# Patient Record
Sex: FEMALE | Race: BLACK OR AFRICAN AMERICAN | NOT HISPANIC OR LATINO | Employment: FULL TIME | URBAN - METROPOLITAN AREA
[De-identification: names, ages, dates, MRNs, and addresses within clinical notes are randomized per-mention and may not be internally consistent; named-entity substitution may affect disease eponyms.]

---

## 2023-04-27 SDOH — ECONOMIC STABILITY: FOOD INSECURITY: WITHIN THE PAST 12 MONTHS, YOU WORRIED THAT YOUR FOOD WOULD RUN OUT BEFORE YOU GOT MONEY TO BUY MORE.: NEVER TRUE

## 2023-04-27 SDOH — HEALTH STABILITY: PHYSICAL HEALTH: ON AVERAGE, HOW MANY DAYS PER WEEK DO YOU ENGAGE IN MODERATE TO STRENUOUS EXERCISE (LIKE A BRISK WALK)?: 3 DAYS

## 2023-04-27 SDOH — ECONOMIC STABILITY: INCOME INSECURITY: IN THE LAST 12 MONTHS, WAS THERE A TIME WHEN YOU WERE NOT ABLE TO PAY THE MORTGAGE OR RENT ON TIME?: NO

## 2023-04-27 SDOH — ECONOMIC STABILITY: FOOD INSECURITY: WITHIN THE PAST 12 MONTHS, THE FOOD YOU BOUGHT JUST DIDN'T LAST AND YOU DIDN'T HAVE MONEY TO GET MORE.: NEVER TRUE

## 2023-04-27 SDOH — ECONOMIC STABILITY: INCOME INSECURITY: HOW HARD IS IT FOR YOU TO PAY FOR THE VERY BASICS LIKE FOOD, HOUSING, MEDICAL CARE, AND HEATING?: NOT VERY HARD

## 2023-04-27 SDOH — HEALTH STABILITY: PHYSICAL HEALTH: ON AVERAGE, HOW MANY MINUTES DO YOU ENGAGE IN EXERCISE AT THIS LEVEL?: 40 MIN

## 2023-04-27 SDOH — ECONOMIC STABILITY: TRANSPORTATION INSECURITY
IN THE PAST 12 MONTHS, HAS LACK OF TRANSPORTATION KEPT YOU FROM MEETINGS, WORK, OR FROM GETTING THINGS NEEDED FOR DAILY LIVING?: NO

## 2023-04-27 SDOH — ECONOMIC STABILITY: TRANSPORTATION INSECURITY
IN THE PAST 12 MONTHS, HAS THE LACK OF TRANSPORTATION KEPT YOU FROM MEDICAL APPOINTMENTS OR FROM GETTING MEDICATIONS?: NO

## 2023-04-27 ASSESSMENT — ENCOUNTER SYMPTOMS
CONSTIPATION: 0
DIARRHEA: 0
COUGH: 0
ABDOMINAL PAIN: 0
DIZZINESS: 0
WEAKNESS: 0
FREQUENCY: 0
HEADACHES: 0
CHILLS: 0
MYALGIAS: 0
SHORTNESS OF BREATH: 0
HEMATURIA: 0
ARTHRALGIAS: 0
DYSURIA: 0
PARESTHESIAS: 0
NAUSEA: 0
NERVOUS/ANXIOUS: 1
PALPITATIONS: 0
SORE THROAT: 0
FEVER: 0
BREAST MASS: 0
EYE PAIN: 0
HEMATOCHEZIA: 0
JOINT SWELLING: 0
HEARTBURN: 0

## 2023-04-27 ASSESSMENT — SOCIAL DETERMINANTS OF HEALTH (SDOH)
HOW OFTEN DO YOU GET TOGETHER WITH FRIENDS OR RELATIVES?: ONCE A WEEK
IN A TYPICAL WEEK, HOW MANY TIMES DO YOU TALK ON THE PHONE WITH FAMILY, FRIENDS, OR NEIGHBORS?: MORE THAN THREE TIMES A WEEK
HOW OFTEN DO YOU ATTEND CHURCH OR RELIGIOUS SERVICES?: 1 TO 4 TIMES PER YEAR
DO YOU BELONG TO ANY CLUBS OR ORGANIZATIONS SUCH AS CHURCH GROUPS UNIONS, FRATERNAL OR ATHLETIC GROUPS, OR SCHOOL GROUPS?: NO

## 2023-04-27 ASSESSMENT — LIFESTYLE VARIABLES
SKIP TO QUESTIONS 9-10: 0
AUDIT-C TOTAL SCORE: 2
HOW MANY STANDARD DRINKS CONTAINING ALCOHOL DO YOU HAVE ON A TYPICAL DAY: 1 OR 2
HOW OFTEN DO YOU HAVE SIX OR MORE DRINKS ON ONE OCCASION: LESS THAN MONTHLY
HOW OFTEN DO YOU HAVE A DRINK CONTAINING ALCOHOL: MONTHLY OR LESS

## 2023-05-01 ENCOUNTER — OFFICE VISIT (OUTPATIENT)
Dept: FAMILY MEDICINE | Facility: CLINIC | Age: 28
End: 2023-05-01
Payer: COMMERCIAL

## 2023-05-01 VITALS
HEIGHT: 64 IN | TEMPERATURE: 97.9 F | DIASTOLIC BLOOD PRESSURE: 80 MMHG | SYSTOLIC BLOOD PRESSURE: 101 MMHG | WEIGHT: 188.9 LBS | BODY MASS INDEX: 32.25 KG/M2 | OXYGEN SATURATION: 99 % | HEART RATE: 66 BPM | RESPIRATION RATE: 16 BRPM

## 2023-05-01 DIAGNOSIS — Z31.69 INFERTILITY COUNSELING: ICD-10-CM

## 2023-05-01 DIAGNOSIS — Z00.00 ROUTINE GENERAL MEDICAL EXAMINATION AT A HEALTH CARE FACILITY: Primary | ICD-10-CM

## 2023-05-01 DIAGNOSIS — Z30.09 FAMILY PLANNING ADVICE: ICD-10-CM

## 2023-05-01 DIAGNOSIS — Z12.4 CERVICAL CANCER SCREENING: ICD-10-CM

## 2023-05-01 PROCEDURE — 99385 PREV VISIT NEW AGE 18-39: CPT | Performed by: NURSE PRACTITIONER

## 2023-05-01 PROCEDURE — G0145 SCR C/V CYTO,THINLAYER,RESCR: HCPCS | Performed by: NURSE PRACTITIONER

## 2023-05-01 RX ORDER — PRENATAL VIT/IRON FUM/FOLIC AC 27MG-0.8MG
1 TABLET ORAL DAILY
Qty: 90 TABLET | Refills: 3
Start: 2023-05-01 | End: 2024-07-30

## 2023-05-01 ASSESSMENT — ENCOUNTER SYMPTOMS
COUGH: 0
MYALGIAS: 0
SORE THROAT: 0
DIZZINESS: 0
ABDOMINAL PAIN: 0
FREQUENCY: 0
ARTHRALGIAS: 0
JOINT SWELLING: 0
PALPITATIONS: 0
CONSTIPATION: 0
CHILLS: 0
WEAKNESS: 0
PARESTHESIAS: 0
EYE PAIN: 0
HEMATOCHEZIA: 0
HEMATURIA: 0
SHORTNESS OF BREATH: 0
DYSURIA: 0
HEARTBURN: 0
HEADACHES: 0
BREAST MASS: 0
DIARRHEA: 0
FEVER: 0
NAUSEA: 0
NERVOUS/ANXIOUS: 1

## 2023-05-01 ASSESSMENT — PAIN SCALES - GENERAL: PAINLEVEL: NO PAIN (0)

## 2023-05-01 NOTE — PROGRESS NOTES
SUBJECTIVE:   CC: Aure is an 28 year old who presents for preventive health visit.       5/1/2023     8:56 AM   Additional Questions   Roomed by Pretty PRIDE   Patient has been advised of split billing requirements and indicates understanding: Yes  Healthy Habits:     Getting at least 3 servings of Calcium per day:  Yes    Bi-annual eye exam:  Yes    Dental care twice a year:  NO    Sleep apnea or symptoms of sleep apnea:  None    Diet:  Regular (no restrictions)    Frequency of exercise:  2-3 days/week    Duration of exercise:  30-45 minutes    Taking medications regularly:  Yes    Medication side effects:  Not applicable    PHQ-2 Total Score: 2    Additional concerns today:  No      Today's PHQ-2 Score:       4/27/2023     7:00 PM   PHQ-2 ( 1999 [employer])   Q1: Little interest or pleasure in doing things 1   Q2: Feeling down, depressed or hopeless 1   PHQ-2 Score 2   Q1: Little interest or pleasure in doing things Not at all    Several days   Q2: Feeling down, depressed or hopeless Several days    Several days   PHQ-2 Score 1    2       Have you ever done Advance Care Planning? (For example, a Health Directive, POLST, or a discussion with a medical provider or your loved ones about your wishes): No, advance care planning information given to patient to review.  Patient plans to discuss their wishes with loved ones or provider.      Social History     Tobacco Use     Smoking status: Never     Smokeless tobacco: Never   Vaping Use     Vaping status: Never Used     Passive vaping exposure: Yes   Substance Use Topics     Alcohol use: Not Currently           4/27/2023     7:00 PM   Alcohol Use   Prescreen: >3 drinks/day or >7 drinks/week? No     Reviewed orders with patient.  Reviewed health maintenance and updated orders accordingly - Yes  No labs needed    Breast Cancer Screening:  n/a      4/27/2023     7:08 PM   Breast CA Risk Assessment (FHS-7)   Do you have a family history of breast, colon, or ovarian cancer? No  / Unknown       History of abnormal Pap smear: NO - age 21-29 PAP every 3 years recommended     Reviewed and updated as needed this visit by clinical staff   Tobacco              Non smoking    Reviewed and updated as needed this visit by Provider                Review of Systems   Constitutional: Negative for chills and fever.   HENT: Negative for congestion, ear pain, hearing loss and sore throat.    Eyes: Negative for pain and visual disturbance.   Respiratory: Negative for cough and shortness of breath.    Cardiovascular: Negative for chest pain, palpitations and peripheral edema.   Gastrointestinal: Negative for abdominal pain, constipation, diarrhea, heartburn, hematochezia and nausea.   Breasts:  Negative for tenderness, breast mass and discharge.   Genitourinary: Negative for dysuria, frequency, genital sores, hematuria, pelvic pain, urgency, vaginal bleeding and vaginal discharge.   Musculoskeletal: Negative for arthralgias, joint swelling and myalgias.   Skin: Negative for rash.   Neurological: Negative for dizziness, weakness, headaches and paresthesias.   Psychiatric/Behavioral: Negative for mood changes. The patient is nervous/anxious.      CONSTITUTIONAL: NEGATIVE for fever, chills, change in weight  INTEGUMENTARU/SKIN: NEGATIVE for worrisome rashes, moles or lesions  EYES: NEGATIVE for vision changes or irritation  ENT: NEGATIVE for ear, mouth and throat problems  RESP: NEGATIVE for significant cough or SOB  BREAST: NEGATIVE for masses, tenderness or discharge  CV: NEGATIVE for chest pain, palpitations or peripheral edema  GI: NEGATIVE for nausea, abdominal pain, heartburn, or change in bowel habits  : NEGATIVE for unusual urinary or vaginal symptoms. Periods are regular.  MUSCULOSKELETAL: NEGATIVE for significant arthralgias or myalgia  NEURO: NEGATIVE for weakness, dizziness or paresthesias  PSYCHIATRIC: NEGATIVE for changes in mood or affect     OBJECTIVE:   /80 (BP Location: Right arm,  "Patient Position: Sitting, Cuff Size: Adult Large)   Pulse 66   Temp 97.9  F (36.6  C) (Oral)   Resp 16   Ht 1.613 m (5' 3.5\")   Wt 85.7 kg (188 lb 14.4 oz)   LMP 04/06/2023 (Exact Date)   SpO2 99%   BMI 32.94 kg/m    Physical Exam  GENERAL: healthy, alert and no distress  EYES: Eyes grossly normal to inspection, PERRL and conjunctivae and sclerae normal  HENT: ear canals and TM's normal, nose and mouth without ulcers or lesions  NECK: no adenopathy, no asymmetry, masses, or scars and thyroid normal to palpation  RESP: lungs clear to auscultation - no rales, rhonchi or wheezes  BREAST: deferred  CV: regular rate and rhythm, normal S1 S2, no S3 or S4, no murmur, click or rub, no peripheral edema and peripheral pulses strong  ABDOMEN: soft, nontender, no hepatosplenomegaly, no masses and bowel sounds normal  : vagina normal, cervix normal, no lesions drainage or abnormal odor noted, pap smear completed  MS: no gross musculoskeletal defects noted, no edema  SKIN: no suspicious lesions or rashes  NEURO: Normal strength and tone, mentation intact and speech normal  PSYCH: mentation appears normal, affect normal/bright        ASSESSMENT/PLAN:   Aure was seen today for physical.    Diagnoses and all orders for this visit:    Routine general medical examination at a health care facility  - up to date  Cervical cancer screening  -     Pap Screen reflex to HPV if ASCUS - recommend age 25 - 29        -  Pap done  Infertility counseling  -     Infertility/AI Referral; Future        -     Patient and  been trying for one year without success, will refer to RE  Family planning advice  -     Prenatal Vit-Fe Fumarate-FA (PRENATAL MULTIVITAMIN W/IRON) 27-0.8 MG tablet; Take 1 tablet by mouth daily    Other orders  -     REVIEW OF HEALTH MAINTENANCE PROTOCOL ORDERS  -     PRIMARY CARE FOLLOW-UP SCHEDULING; Future        Patient has been advised of split billing requirements and indicates understanding: " "Yes      COUNSELING:  Reviewed preventive health counseling, as reflected in patient instructions       Regular exercise       Healthy diet/nutrition       Family planning       Folic Acid      BMI:   Estimated body mass index is 32.94 kg/m  as calculated from the following:    Height as of this encounter: 1.613 m (5' 3.5\").    Weight as of this encounter: 85.7 kg (188 lb 14.4 oz).   Weight management plan: Discussed healthy diet and exercise guidelines      She reports that she has never smoked. She has never used smokeless tobacco.      Carina Cuello NP  [facility]  "

## 2023-05-01 NOTE — PATIENT INSTRUCTIONS
Check out CCR for infertility and make an appointment.     [email or web address]    Preventive Health Recommendations  Female Ages 26 - 39  Yearly exam:   See your health care provider every year in order to  Review health changes.   Discuss preventive care.    Review your medicines if you your doctor has prescribed any.    Until age 30: Get a Pap test every three years (more often if you have had an abnormal result).    After age 30: Talk to your doctor about whether you should have a Pap test every 3 years or have a Pap test with HPV screening every 5 years.   You do not need a Pap test if your uterus was removed (hysterectomy) and you have not had cancer.  You should be tested each year for STDs (sexually transmitted diseases), if you're at risk.   Talk to your provider about how often to have your cholesterol checked.  If you are at risk for diabetes, you should have a diabetes test (fasting glucose).  Shots: Get a flu shot each year. Get a tetanus shot every 10 years.   Nutrition:   Eat at least 5 servings of fruits and vegetables each day.  Eat whole-grain bread, whole-wheat pasta and brown rice instead of white grains and rice.  Get adequate Calcium and Vitamin D.     Lifestyle  Exercise at least 150 minutes a week (30 minutes a day, 5 days of the week). This will help you control your weight and prevent disease.  Limit alcohol to one drink per day.  No smoking.   Wear sunscreen to prevent skin cancer.  See your dentist every six months for an exam and cleaning.

## 2023-05-03 LAB
BKR LAB AP GYN ADEQUACY: NORMAL
BKR LAB AP GYN INTERPRETATION: NORMAL
BKR LAB AP HPV REFLEX: NORMAL
BKR LAB AP LMP: NORMAL
BKR LAB AP PREVIOUS ABNORMAL: NORMAL
PATH REPORT.COMMENTS IMP SPEC: NORMAL
PATH REPORT.COMMENTS IMP SPEC: NORMAL
PATH REPORT.RELEVANT HX SPEC: NORMAL

## 2023-05-22 ENCOUNTER — OFFICE VISIT (OUTPATIENT)
Dept: MIDWIFE SERVICES | Facility: CLINIC | Age: 28
End: 2023-05-22
Payer: COMMERCIAL

## 2023-05-22 VITALS
WEIGHT: 190 LBS | HEART RATE: 75 BPM | SYSTOLIC BLOOD PRESSURE: 136 MMHG | BODY MASS INDEX: 33.13 KG/M2 | TEMPERATURE: 97 F | DIASTOLIC BLOOD PRESSURE: 82 MMHG

## 2023-05-22 DIAGNOSIS — N91.2 AMENORRHEA: Primary | ICD-10-CM

## 2023-05-22 DIAGNOSIS — Z31.69 ENCOUNTER FOR PRECONCEPTION CONSULTATION: ICD-10-CM

## 2023-05-22 LAB
HCG INTACT+B SERPL-ACNC: <1 MIU/ML
TSH SERPL DL<=0.005 MIU/L-ACNC: 1.36 UIU/ML (ref 0.3–4.2)

## 2023-05-22 PROCEDURE — 99203 OFFICE O/P NEW LOW 30 MIN: CPT | Performed by: ADVANCED PRACTICE MIDWIFE

## 2023-05-22 PROCEDURE — 84702 CHORIONIC GONADOTROPIN TEST: CPT | Performed by: ADVANCED PRACTICE MIDWIFE

## 2023-05-22 PROCEDURE — 84443 ASSAY THYROID STIM HORMONE: CPT | Performed by: ADVANCED PRACTICE MIDWIFE

## 2023-05-22 PROCEDURE — 36415 COLL VENOUS BLD VENIPUNCTURE: CPT | Performed by: ADVANCED PRACTICE MIDWIFE

## 2023-05-22 NOTE — PROGRESS NOTES
Aure Puckett 28 year old Patient's last menstrual period was 04/06/2023 (exact date). Nullip.    CC: missed menses    HPI:  Pt has been trying to get pregnant for over a year and has not been able to.  Right now she is 10 days late on her menses with negative home pregnancy tests.   Emotional in talking about trying to conceive.  Reports regular unprotected IC over the past year   Taking PNV    Noemí removed a little over a year ago   Menses q 33-37 days since then   Never smoker   Denies STI hx   Body mass index is 33.13 kg/m .     A:  Healthy 28 yr/old   Desires conception    P:  (N91.2) Amenorrhea  (primary encounter diagnosis)  Comment:   Plan: hCG Quantitative Pregnancy, TSH with free T4         reflex            (Z31.69) Encounter for preconception consultation  Comment:   Plan:          If beta hcg is negative will plan a progesterone challenge to trigger menses  Message sent to schedulers to help her make appt with OB/Gyn for infertility work up   Message sent to Saray Boudreaux for emotional support   Pt could also consider mind body work, yoga, meditation, acupuncture     MURIEL Lopez CNM

## 2023-05-22 NOTE — PATIENT INSTRUCTIONS
Dear Aure     It was nice to meet with you today.  If your blood level of pregnancy hormone level is negative then I will send a short course of progesterone to your pharmacy to take for 5 days and then stop which should trigger a period.    I would also like you to make an appointment with one of our OB/gyn physicians     Dr Ludivina Edouard, Dr Diana Ward and Dr Lydia Stahl come here to [place].    Dr Sophia Escobar, and Dr Georgia Espino go to [place].    Dr Susan Delacruz, Ludivina Joseph and Dr Ifeoma Mahajan only see people at [facility].  The above mentioned physicians also see patients at [facility].    If you have any questions please let me know.    MURIEL Lopez CNM

## 2023-05-23 ENCOUNTER — TELEPHONE (OUTPATIENT)
Dept: MIDWIFE SERVICES | Facility: CLINIC | Age: 28
End: 2023-05-23
Payer: COMMERCIAL

## 2023-05-23 ENCOUNTER — MYC MEDICAL ADVICE (OUTPATIENT)
Dept: MIDWIFE SERVICES | Facility: CLINIC | Age: 28
End: 2023-05-23
Payer: COMMERCIAL

## 2023-05-23 DIAGNOSIS — N91.1 SECONDARY AMENORRHEA: Primary | ICD-10-CM

## 2023-05-23 RX ORDER — PROGESTERONE 200 MG/1
200 CAPSULE ORAL DAILY
Qty: 5 CAPSULE | Refills: 0 | Status: SHIPPED | OUTPATIENT
Start: 2023-05-23 | End: 2023-11-08

## 2023-05-25 ENCOUNTER — TELEPHONE (OUTPATIENT)
Dept: BEHAVIORAL HEALTH | Facility: CLINIC | Age: 28
End: 2023-05-25
Payer: COMMERCIAL

## 2023-05-25 NOTE — TELEPHONE ENCOUNTER
Patient outreach per CNM referral. No answer - left VM and sent MyChart message with [facility] information.

## 2023-05-26 ENCOUNTER — OFFICE VISIT (OUTPATIENT)
Dept: OBGYN | Facility: CLINIC | Age: 28
End: 2023-05-26
Payer: COMMERCIAL

## 2023-05-26 VITALS
SYSTOLIC BLOOD PRESSURE: 100 MMHG | BODY MASS INDEX: 33.44 KG/M2 | HEART RATE: 67 BPM | WEIGHT: 191.8 LBS | DIASTOLIC BLOOD PRESSURE: 70 MMHG

## 2023-05-26 DIAGNOSIS — N92.6 IRREGULAR MENSTRUAL CYCLE: Primary | ICD-10-CM

## 2023-05-26 LAB — MIS SERPL-MCNC: 7.25 NG/ML (ref 0.89–9.9)

## 2023-05-26 PROCEDURE — 99215 OFFICE O/P EST HI 40 MIN: CPT | Performed by: OBSTETRICS & GYNECOLOGY

## 2023-05-26 PROCEDURE — 83520 IMMUNOASSAY QUANT NOS NONAB: CPT | Performed by: OBSTETRICS & GYNECOLOGY

## 2023-05-26 PROCEDURE — 36415 COLL VENOUS BLD VENIPUNCTURE: CPT | Performed by: OBSTETRICS & GYNECOLOGY

## 2023-05-26 NOTE — PROGRESS NOTES
CC: getting pregnant  HPI: Aure Puckett is a 28 year old female Patient's last menstrual period was 05/24/2023 (exact date). Menses are regular q 32-34 days, lasting 5 days. Dysmenorrhea mild, occurring first 1-2 days of flow.   Cyclic symptoms include  breast tenderness and bloating.   Additional symptoms include none    The patient has been trying to conceive for 1 year since IUD was removed.  They are not timing intercourse or planning, just not contracepting in the hopes they get pregnant.  Her periods have been regular until last month when she was 18 days late, not pregnant, and she began to worry that maybe something is wrong     Prior pregnancy history is as follows: none    Allergies: Patient has no known allergies.                    No past medical history on file.    No past surgical history on file.        Social History     Tobacco Use     Smoking status: Never     Smokeless tobacco: Never   Vaping Use     Vaping status: Never Used     Passive vaping exposure: Yes   Substance Use Topics     Alcohol use: Not Currently              Family History   Problem Relation Age of Onset     Hypertension Mother      Depression Mother      Anxiety Disorder Mother      Diabetes Father      Hypertension Father        Current Outpatient Medications   Medication Sig Dispense Refill     Prenatal Vit-Fe Fumarate-FA (PRENATAL MULTIVITAMIN W/IRON) 27-0.8 MG tablet Take 1 tablet by mouth daily 90 tablet 3     progesterone (PROMETRIUM) 200 MG capsule Take 1 capsule (200 mg) by mouth daily 5 capsule 0       Past GYN history:  No STD history  History of abnormal PAP: No  History of surgery to cervix: No  PID History: No  History of  IUD use: Yes, dwayne, removed 1 year ago  Galactorrhea: No  Hirsuitism: No  Intolerance to hot or cold: No  Significant change in weight within last year: No  Use of lubercants with intercourse: No      PAST INFERTILITY EVALUATION AND TREATMENT:  Patient's work up has included: none    Hormonal  evaluation has included:   TSH wnl     Past infertility treatment included none.    Partner is 28 years old. No history of trauma to the genitalia, medications, tobacco, drug use. No prior SA. No prior children.    EXAM:  Blood pressure 100/70, pulse 67, weight 87 kg (191 lb 12.8 oz), last menstrual period 05/24/2023.  BMI= Body mass index is 33.44 kg/m .  Patient's last menstrual period was 05/24/2023 (exact date).  General - pleasant female in no acute distress.  Neurological - alert and oriented X 3  Psychiatric - normal mood and affect    prep time day of service 5  visit time with the patient 30  post visit work including documentation time 7  Total time: 42    ASSESSMENT/ PLAN: irregular menses and infertility   We discussed common causes of infertility including female factors, male factors, combination and unexplained infertility.  We reviewed the standard workup including day 3 labs, HSG and SA.  I reassured her that it is common to have an irreg or missed period from time to time and typically corrects the next time.  If irregular menses continue for more than 3-4 months, will address.    In regards to fertility eval, she is not sure if she wants to pursue a full evaluation.  Would be interested in starting with just AMH.  She is aware that normal AMH cannot indicate future fertility, but would let us know if there are any concerns related to not getting pregnant now. If she decides to pursue full eval she will let me know. Handouts given.  Questions answered.    ISA TRUJILLO MD

## 2023-10-25 ENCOUNTER — MYC MEDICAL ADVICE (OUTPATIENT)
Dept: FAMILY MEDICINE | Facility: CLINIC | Age: 28
End: 2023-10-25
Payer: COMMERCIAL

## 2023-10-25 NOTE — TELEPHONE ENCOUNTER
Writer calls patient regarding her my chart message. Patient is looking to start medication along with seeing her therapist for anxiety and depression. Is going to bring her  along with her to her appointment for support.   Appointment made.  Appointments in Next Year      Nov 01, 2023  5:00 PM  (Arrive by 4:40 PM)  Provider Visit with Susan Rachele Haase, APRN CNP  [facility] ([facility] ) [phone]

## 2023-10-30 ASSESSMENT — ANXIETY QUESTIONNAIRES
6. BECOMING EASILY ANNOYED OR IRRITABLE: NEARLY EVERY DAY
3. WORRYING TOO MUCH ABOUT DIFFERENT THINGS: MORE THAN HALF THE DAYS
8. IF YOU CHECKED OFF ANY PROBLEMS, HOW DIFFICULT HAVE THESE MADE IT FOR YOU TO DO YOUR WORK, TAKE CARE OF THINGS AT HOME, OR GET ALONG WITH OTHER PEOPLE?: SOMEWHAT DIFFICULT
GAD7 TOTAL SCORE: 17
IF YOU CHECKED OFF ANY PROBLEMS ON THIS QUESTIONNAIRE, HOW DIFFICULT HAVE THESE PROBLEMS MADE IT FOR YOU TO DO YOUR WORK, TAKE CARE OF THINGS AT HOME, OR GET ALONG WITH OTHER PEOPLE: SOMEWHAT DIFFICULT
7. FEELING AFRAID AS IF SOMETHING AWFUL MIGHT HAPPEN: MORE THAN HALF THE DAYS
GAD7 TOTAL SCORE: 17
7. FEELING AFRAID AS IF SOMETHING AWFUL MIGHT HAPPEN: MORE THAN HALF THE DAYS
4. TROUBLE RELAXING: NEARLY EVERY DAY
2. NOT BEING ABLE TO STOP OR CONTROL WORRYING: NEARLY EVERY DAY
1. FEELING NERVOUS, ANXIOUS, OR ON EDGE: NEARLY EVERY DAY
5. BEING SO RESTLESS THAT IT IS HARD TO SIT STILL: SEVERAL DAYS

## 2023-11-01 ENCOUNTER — TELEPHONE (OUTPATIENT)
Dept: FAMILY MEDICINE | Facility: CLINIC | Age: 28
End: 2023-11-01

## 2023-11-01 ENCOUNTER — OFFICE VISIT (OUTPATIENT)
Dept: FAMILY MEDICINE | Facility: CLINIC | Age: 28
End: 2023-11-01
Payer: COMMERCIAL

## 2023-11-01 VITALS
HEART RATE: 69 BPM | TEMPERATURE: 97.6 F | RESPIRATION RATE: 14 BRPM | OXYGEN SATURATION: 95 % | BODY MASS INDEX: 33.29 KG/M2 | WEIGHT: 195 LBS | SYSTOLIC BLOOD PRESSURE: 127 MMHG | HEIGHT: 64 IN | DIASTOLIC BLOOD PRESSURE: 80 MMHG

## 2023-11-01 DIAGNOSIS — F41.9 ANXIETY: ICD-10-CM

## 2023-11-01 DIAGNOSIS — F41.9 ANXIETY: Primary | ICD-10-CM

## 2023-11-01 PROCEDURE — 99214 OFFICE O/P EST MOD 30 MIN: CPT | Performed by: NURSE PRACTITIONER

## 2023-11-01 RX ORDER — ESCITALOPRAM OXALATE 5 MG/1
TABLET ORAL
Qty: 60 TABLET | Refills: 1 | Status: SHIPPED | OUTPATIENT
Start: 2023-11-01 | End: 2023-11-08

## 2023-11-01 ASSESSMENT — ENCOUNTER SYMPTOMS: NERVOUS/ANXIOUS: 1

## 2023-11-01 NOTE — TELEPHONE ENCOUNTER
Medication Question or Refill        What medication are you calling about (include dose and sig)?: Patient calling after hours. She said she had an office visit with Dr. Haase today at [place], and she was going to prescribe Lexapro for her. It looks like that needs to be transmitted to [facility] in [place] below.  Can Dr. Diez do this for patient asap? Thank you!    Preferred Pharmacy:   [id number] [place], [place] - [address] [facility] [address] [facility] [address]  Phone: [phone] Fax: [phone]    Controlled Substance Agreement on file:   CSA -- Patient Level:    CSA: None found at the patient level.       Who prescribed the medication?:Dr. Haase     Do you need a refill? Prescribed per patient on 11/1    When did you use the medication last? N/A    Patient offered an appointment? No    Do you have any questions or concerns?  No      Could we send this information to you in [facility] or would you prefer to receive a phone call?:   Patient would prefer a phone call   Okay to leave a detailed message?: Yes at Cell number on file:    Telephone Information:   Mobile [phone]

## 2023-11-01 NOTE — PROGRESS NOTES
"  Assessment & Plan     Anxiety: MAUDE 7 of 17, PHQ 9 of 17.  Feels that anxiety triggers depression, will focus on a medication that will lessen anxiety.  Discussed risks and benefits of medication, need to take on a daily basis, will take at least 2-4 weeks to notice decrease in anxiety, if symptoms of depression worsen stop taking medication and notify the clinic.  Is aware of emergency resources.  Encouraged increased in exercise and counseling.       BMI:   Estimated body mass index is 34 kg/m  as calculated from the following:    Height as of this encounter: 1.613 m (5' 3.5\").    Weight as of this encounter: 88.5 kg (195 lb).       FUTURE APPOINTMENTS:       - Follow-up visit in 6 weeks with PCP    Susan Haase, APRN [facility]    Azeem Looney is a 28 year old, presenting for the following health issues:  Anxiety and Depression        11/1/2023     4:53 PM   Additional Questions   Roomed by Kailash Calvillo   Accompanied by  Lawrence       Anxiety    History of Present Illness       Mental Health Follow-up:  Patient presents to follow-up on Anxiety.    Patient's anxiety since last visit has been:  No change  The patient is not having other symptoms associated with anxiety.  Any significant life events: No  Patient is feeling anxious or having panic attacks.  Patient has no concerns about alcohol or drug use.    She eats 2-3 servings of fruits and vegetables daily.She consumes 3 sweetened beverage(s) daily.She exercises with enough effort to increase her heart rate 30 to 60 minutes per day.  She exercises with enough effort to increase her heart rate 3 or less days per week.   She is taking medications regularly.     Aure is having symptoms of anxiety that have been present since teens, symptoms are getting worse.  She feels that the anxiety causes her to feel depressed.  Most recent PHQ 9 of 17, MAUDE 7 of 17.  Denies thoughts of harming self or others.  Currently in therapy " "which she is finding helpful but is at the point that she would also like to add on a medication, she fees that the anxiety is always at a higher steady state, at times has what feel like panic attacks.  Has never been treated with medication for anxiety or depression.    Has support of her  and family.         Review of Systems   Psychiatric/Behavioral:  The patient is nervous/anxious.         Objective    /80 (BP Location: Right arm, Patient Position: Chair, Cuff Size: Adult Large)   Pulse 69   Temp 97.6  F (36.4  C) (Oral)   Resp 14   Ht 1.613 m (5' 3.5\")   Wt 88.5 kg (195 lb)   LMP 10/12/2023 (Approximate)   SpO2 95%   BMI 34.00 kg/m    Body mass index is 34 kg/m .  Physical Exam   GENERAL: healthy, alert and no distress  PSYCH: mentation appears normal, affect normal/bright                "

## 2023-11-08 RX ORDER — ESCITALOPRAM OXALATE 10 MG/1
10 TABLET ORAL DAILY
Qty: 90 TABLET | Refills: 0 | Status: SHIPPED | OUTPATIENT
Start: 2023-11-08 | End: 2023-12-13

## 2023-11-08 NOTE — TELEPHONE ENCOUNTER
Lexapro rx started 5 mg and bumped up to 10 and insurance will not cover 2 tablets daily, is asking for 10 mg tablet--pended.   Carleen Ann R.N.

## 2023-12-12 ASSESSMENT — ANXIETY QUESTIONNAIRES
GAD7 TOTAL SCORE: 4
2. NOT BEING ABLE TO STOP OR CONTROL WORRYING: SEVERAL DAYS
1. FEELING NERVOUS, ANXIOUS, OR ON EDGE: SEVERAL DAYS
4. TROUBLE RELAXING: NOT AT ALL
IF YOU CHECKED OFF ANY PROBLEMS ON THIS QUESTIONNAIRE, HOW DIFFICULT HAVE THESE PROBLEMS MADE IT FOR YOU TO DO YOUR WORK, TAKE CARE OF THINGS AT HOME, OR GET ALONG WITH OTHER PEOPLE: SOMEWHAT DIFFICULT
6. BECOMING EASILY ANNOYED OR IRRITABLE: NOT AT ALL
5. BEING SO RESTLESS THAT IT IS HARD TO SIT STILL: SEVERAL DAYS
7. FEELING AFRAID AS IF SOMETHING AWFUL MIGHT HAPPEN: SEVERAL DAYS
3. WORRYING TOO MUCH ABOUT DIFFERENT THINGS: NOT AT ALL
GAD7 TOTAL SCORE: 4

## 2023-12-13 ENCOUNTER — VIRTUAL VISIT (OUTPATIENT)
Dept: FAMILY MEDICINE | Facility: CLINIC | Age: 28
End: 2023-12-13
Payer: COMMERCIAL

## 2023-12-13 DIAGNOSIS — F41.9 ANXIETY: ICD-10-CM

## 2023-12-13 PROCEDURE — 99213 OFFICE O/P EST LOW 20 MIN: CPT | Mod: VID | Performed by: NURSE PRACTITIONER

## 2023-12-13 RX ORDER — INFLUENZA A VIRUS A/GUANGDONG-MAONAN/SWL1536/2019 CNIC-1909 (H1N1) ANTIGEN (FORMALDEHYDE INACTIVATED), INFLUENZA A VIRUS A/HONG KONG/2671/2019 (H3N2) ANTIGEN (FORMALDEHYDE INACTIVATED), INFLUENZA B VIRUS B/PHUKET/3073/2013 ANTIGEN (FORMALDEHYDE INACTIVATED), AND INFLUENZA B VIRUS B/WASHINGTON/02/2019 ANTIGEN (FORMALDEHYDE INACTIVATED) 15; 15; 15; 15 UG/.5ML; UG/.5ML; UG/.5ML; UG/.5ML
INJECTION, SUSPENSION INTRAMUSCULAR
COMMUNITY
Start: 2023-10-20 | End: 2024-07-30

## 2023-12-13 RX ORDER — ESCITALOPRAM OXALATE 10 MG/1
10 TABLET ORAL DAILY
Qty: 90 TABLET | Refills: 3 | Status: SHIPPED | OUTPATIENT
Start: 2023-12-13

## 2023-12-13 ASSESSMENT — ENCOUNTER SYMPTOMS: NERVOUS/ANXIOUS: 1

## 2023-12-13 NOTE — PROGRESS NOTES
"Aure is a 28 year old who is being evaluated via a billable video visit.      How would you like to obtain your AVS? MyChart  If the video visit is dropped, the invitation should be resent by: Send to e-mail at: [email or web address]  Will anyone else be joining your video visit? No          Assessment & Plan     Anxiety  Doing well, no changes, follow up if need change in medication  - escitalopram (LEXAPRO) 10 MG tablet  Dispense: 90 tablet; Refill: 3       BMI:   Estimated body mass index is 34 kg/m  as calculated from the following:    Height as of 11/1/23: 1.613 m (5' 3.5\").    Weight as of 11/1/23: 88.5 kg (195 lb).       Carina Batres NP  [facility]    Azeem Looney is a 28 year old, presenting for the following health issues:  Anxiety (Follow up on her anxiety medications.  Feels is help[ing quite a bit.)        11/1/2023     4:53 PM   Additional Questions   Roomed by Kailash Calvillo   Accompanied by  Lawrence Deluna    History of Present Illness       Mental Health Follow-up:  Patient presents to follow-up on Depression & Anxiety.Patient's depression since last visit has been:  Good  The patient is not having other symptoms associated with depression.  Patient's anxiety since last visit has been:  Better  The patient is having other symptoms associated with anxiety.  Any significant life events: No  Patient is feeling anxious or having panic attacks.  Patient has no concerns about alcohol or drug use.    She eats 2-3 servings of fruits and vegetables daily.She consumes 2 sweetened beverage(s) daily.She exercises with enough effort to increase her heart rate 20 to 29 minutes per day.  She exercises with enough effort to increase her heart rate 3 or less days per week.   She is taking medications regularly.       Medication Followup of Anxiety  Taking Medication as prescribed: yes  Side Effects:  None  Medication Helping Symptoms:  yes          Review of " "Systems   Psychiatric/Behavioral:  The patient is nervous/anxious.             Objective    Vitals - Patient Reported  Weight (Patient Reported): 88.5 kg (195 lb)  Height (Patient Reported): 161.3 cm (5' 3.5\")  BMI (Based on Pt Reported Ht/Wt): 34  Pain Score: No Pain (0)        Physical Exam   GENERAL: Healthy, alert and no distress  EYES: Eyes grossly normal to inspection.  No discharge or erythema, or obvious scleral/conjunctival abnormalities.  RESP: No audible wheeze, cough, or visible cyanosis.  No visible retractions or increased work of breathing.    SKIN: Visible skin clear. No significant rash, abnormal pigmentation or lesions.  NEURO: Cranial nerves grossly intact.  Mentation and speech appropriate for age.  PSYCH: Mentation appears normal, affect normal/bright, judgement and insight intact, normal speech and appearance well-groomed.                Video-Visit Details    Type of service:  Video Visit     Originating Location (pt. Location): Home    Distant Location (provider location):  On-site  Platform used for Video Visit: Michelle      "

## 2024-02-05 ENCOUNTER — MYC REFILL (OUTPATIENT)
Dept: FAMILY MEDICINE | Facility: CLINIC | Age: 29
End: 2024-02-05
Payer: COMMERCIAL

## 2024-02-05 DIAGNOSIS — F41.9 ANXIETY: ICD-10-CM

## 2024-02-06 RX ORDER — ESCITALOPRAM OXALATE 10 MG/1
10 TABLET ORAL DAILY
Qty: 90 TABLET | Refills: 3 | OUTPATIENT
Start: 2024-02-06

## 2024-02-12 ENCOUNTER — OFFICE VISIT (OUTPATIENT)
Dept: OBGYN | Facility: CLINIC | Age: 29
End: 2024-02-12
Payer: COMMERCIAL

## 2024-02-12 VITALS
WEIGHT: 194 LBS | SYSTOLIC BLOOD PRESSURE: 118 MMHG | DIASTOLIC BLOOD PRESSURE: 79 MMHG | HEART RATE: 76 BPM | OXYGEN SATURATION: 97 % | BODY MASS INDEX: 33.83 KG/M2

## 2024-02-12 DIAGNOSIS — N92.6 IRREGULAR MENSTRUAL CYCLE: Primary | ICD-10-CM

## 2024-02-12 LAB — HCG UR QL: NEGATIVE

## 2024-02-12 PROCEDURE — 99213 OFFICE O/P EST LOW 20 MIN: CPT | Performed by: OBSTETRICS & GYNECOLOGY

## 2024-02-12 PROCEDURE — 81025 URINE PREGNANCY TEST: CPT | Performed by: OBSTETRICS & GYNECOLOGY

## 2024-02-12 RX ORDER — MULTIVITAMIN
1 TABLET ORAL DAILY
COMMUNITY

## 2024-02-12 NOTE — PROGRESS NOTES
S; Aure Puckett is a 29 year old  who is here to discussed recent missed period.  She was seen by me about 9 months ago and we discussed that she had had her IUD out for about a year and had not conceived and that periods were slightly irregular.  They range in cycle length, although for the most part were monthly.  At the time we discussed infertility eval and she was not interested yet, although curious about AMH, so that was done and was normal.  Until November her periods had remained similar, but then she has not had one since then, although she began spotting today.  She has had multiple negative pregnancy tests.  Wondering how concerned she should be and what might be going on.  There have been no major life events although she has been stressed out.    O: /79   Pulse 76   Wt 88 kg (194 lb)   LMP 2024   SpO2 97%   BMI 33.83 kg/m      Gen: NAD    A/P; irregular menses   We discussed that missed periods in isolation are not uncommon and typically are not necessarily a sign of an underlying problem or concern.  We discussed that in the setting of desired fertility, certainly that becomes more of an issue.  We discussed secondary amenorrhea and that if she were to go another 2 months without a period we would want to do full eval at that time.   We also discussed fertility in terms of her age and menstrual history as well as not getting pregnant for 1.5yrs.  I offered full evaluation now and we discussed potential mgmt options depending on findings of eval.  She is not sure she is ready for that yet, will consider.  We discussed various coverage by insurance, so she will likely start by seeing what amount of coverage they may have, if any.  She will contact me if she desires fertility eval or if she has not had a full period in the next 2 months.  Questions answered    ISA TRUJILLO MD

## 2024-03-26 DIAGNOSIS — N92.6 IRREGULAR MENSTRUAL CYCLE: Primary | ICD-10-CM

## 2024-04-01 ENCOUNTER — HOSPITAL ENCOUNTER (OUTPATIENT)
Dept: GENERAL RADIOLOGY | Facility: CLINIC | Age: 29
Discharge: HOME | End: 2024-04-01
Attending: OBSTETRICS & GYNECOLOGY | Admitting: OBSTETRICS & GYNECOLOGY
Payer: COMMERCIAL

## 2024-04-01 DIAGNOSIS — N92.6 IRREGULAR MENSTRUAL CYCLE: ICD-10-CM

## 2024-04-01 PROCEDURE — 58340 CATHETER FOR HYSTEROGRAPHY: CPT

## 2024-04-01 PROCEDURE — 255N000002 HC RX 255 OP 636: Performed by: OBSTETRICS & GYNECOLOGY

## 2024-04-01 RX ORDER — IOPAMIDOL 612 MG/ML
50 INJECTION, SOLUTION INTRAVASCULAR ONCE
Status: COMPLETED | OUTPATIENT
Start: 2024-04-01 | End: 2024-04-01

## 2024-04-01 RX ORDER — IOPAMIDOL 510 MG/ML
50 INJECTION, SOLUTION INTRAVASCULAR ONCE
Status: DISCONTINUED | OUTPATIENT
Start: 2024-04-01 | End: 2024-04-01 | Stop reason: DRUGHIGH

## 2024-04-01 RX ORDER — IOPAMIDOL 612 MG/ML
50 INJECTION, SOLUTION INTRAVASCULAR ONCE
Status: DISCONTINUED | OUTPATIENT
Start: 2024-04-01 | End: 2024-04-01

## 2024-04-01 RX ADMIN — IOPAMIDOL 5 ML: 612 INJECTION, SOLUTION INTRAVENOUS at 09:04

## 2024-04-01 NOTE — DISCHARGE INSTRUCTIONS
HSG (Hysterosalpingogram) Discharge Instructions    Diet and medicines:  You may go back to your normal diet and medicines.  You may take Tylenol (acetaminophen) or Advil (ibuprofen).    Activity: You may go back to your normal routine.    Side effects:  Expect mild cramping today.  You may have bloody spotting or brown, sticky discharge for up to two days.    Other instructions:     Call your doctor if you have:  Hives.  Problems breathing.  Swelling.  Signs of infection, which include:  A fever over 101  F (38.3  C), taken under the tongue.  Unusual discharge from your vagina.  Pain in lower abdomen (belly).    Questions? Call your hospital:   [facility] [phone]

## 2024-04-11 ENCOUNTER — LAB (OUTPATIENT)
Dept: LAB | Facility: CLINIC | Age: 29
End: 2024-04-11
Payer: COMMERCIAL

## 2024-04-11 DIAGNOSIS — N92.6 IRREGULAR MENSTRUAL CYCLE: ICD-10-CM

## 2024-04-11 LAB
ESTRADIOL SERPL-MCNC: 70 PG/ML
FSH SERPL IRP2-ACNC: 6.9 MIU/ML
MIS SERPL-MCNC: 8.8 NG/ML (ref 0.89–9.9)
PROLACTIN SERPL 3RD IS-MCNC: 25 NG/ML (ref 5–23)
TSH SERPL DL<=0.005 MIU/L-ACNC: 1.46 UIU/ML (ref 0.3–4.2)

## 2024-04-11 PROCEDURE — 82166 ASSAY ANTI-MULLERIAN HORM: CPT

## 2024-04-11 PROCEDURE — 83001 ASSAY OF GONADOTROPIN (FSH): CPT

## 2024-04-11 PROCEDURE — 82670 ASSAY OF TOTAL ESTRADIOL: CPT

## 2024-04-11 PROCEDURE — 84146 ASSAY OF PROLACTIN: CPT

## 2024-04-11 PROCEDURE — 36415 COLL VENOUS BLD VENIPUNCTURE: CPT

## 2024-04-11 PROCEDURE — 84443 ASSAY THYROID STIM HORMONE: CPT

## 2024-04-17 ENCOUNTER — LAB (OUTPATIENT)
Dept: LAB | Facility: CLINIC | Age: 29
End: 2024-04-17
Payer: COMMERCIAL

## 2024-04-17 ENCOUNTER — DOCUMENTATION ONLY (OUTPATIENT)
Dept: OBGYN | Facility: CLINIC | Age: 29
End: 2024-04-17

## 2024-04-17 DIAGNOSIS — R79.89 ELEVATED PROLACTIN LEVEL: Primary | ICD-10-CM

## 2024-04-17 DIAGNOSIS — R79.89 ELEVATED PROLACTIN LEVEL: ICD-10-CM

## 2024-04-17 PROCEDURE — 36415 COLL VENOUS BLD VENIPUNCTURE: CPT

## 2024-04-17 PROCEDURE — 84146 ASSAY OF PROLACTIN: CPT

## 2024-04-18 DIAGNOSIS — R79.89 ELEVATED PROLACTIN LEVEL: Primary | ICD-10-CM

## 2024-04-18 LAB — PROLACTIN SERPL 3RD IS-MCNC: 34 NG/ML (ref 5–23)

## 2024-06-17 NOTE — CONFIDENTIAL NOTE
RECORDS RECEIVED FROM: internal    DATE RECEIVED: 6.19.24    NOTES (FOR ALL VISITS) STATUS DETAILS   OFFICE NOTES from referring provider internal    Alida Delacruz MD      MEDICATION LIST internal     LABS     DIABETES: HBGA1C, CREATININE, FASTING LIPIDS, MICROALBUMIN URINE, POTASSIUM, TSH, T4    THYROID: TSH, T4, CBC, THYRODLONULIN, TOTAL T3, FREE T4, CALCITONIN, CEA internal  Prolactin- 4.11.24   TSH/T4-  4.11.24   FSH-  4.11.24

## 2024-06-19 ENCOUNTER — VIRTUAL VISIT (OUTPATIENT)
Dept: ENDOCRINOLOGY | Facility: CLINIC | Age: 29
End: 2024-06-19
Attending: OBSTETRICS & GYNECOLOGY
Payer: COMMERCIAL

## 2024-06-19 ENCOUNTER — PRE VISIT (OUTPATIENT)
Dept: ENDOCRINOLOGY | Facility: CLINIC | Age: 29
End: 2024-06-19

## 2024-06-19 VITALS — WEIGHT: 180 LBS | BODY MASS INDEX: 31.39 KG/M2

## 2024-06-19 DIAGNOSIS — N97.9 FEMALE INFERTILITY: ICD-10-CM

## 2024-06-19 DIAGNOSIS — R79.89 ELEVATED PROLACTIN LEVEL: Primary | ICD-10-CM

## 2024-06-19 PROCEDURE — 99244 OFF/OP CNSLTJ NEW/EST MOD 40: CPT | Mod: 95 | Performed by: INTERNAL MEDICINE

## 2024-06-19 RX ORDER — CABERGOLINE 0.5 MG/1
0.25 TABLET ORAL WEEKLY
Qty: 6 TABLET | Refills: 3 | Status: SHIPPED | OUTPATIENT
Start: 2024-06-19

## 2024-06-19 NOTE — NURSING NOTE
Is the patient currently in the state of [place]? YES    Visit mode:VIDEO    If the visit is dropped, the patient can be reconnected by: VIDEO VISIT: Text to cell phone:   Telephone Information:   Mobile [phone]       Will anyone else be joining the visit? NO  (If patient encounters technical issues they should call [phone]    How would you like to obtain your AVS? MyChart    Are changes needed to the allergy or medication list? No    Are refills needed on medications prescribed by this physician? NO    Reason for visit: Video Visit and RECHECK    Doris MICHAEL

## 2024-06-19 NOTE — LETTER
6/19/2024       RE: Aure Puckett  [address]  [facility] [address]     Dear Colleague,    Thank you for referring your patient, Aure Puckett, to the [facility] [place] [facility]. Please see a copy of my visit note below.    Video visit  Start 10:15 am  End 10:50 am  [place]                                                                               - Endocrinology Initial Consultation -    Reason for visit/consult:  Elevated prolactin level     Primary care provider: System, Provider Not In    HPI: A 29 year old female is here for endocrinology clinic visit for elevated prolactin level.   She has been trying to become pregnant for 3 years. She went to OB-GYN evaluation, where PRL level was checked.  4/11/2024 25, 4/17/2024 34 (PRL 5-23).     Menstrual cycle: 2023 no period for 3 months, past 5 months 32-34 days consistently. Flow medium-light.   Menarche: age 12. Not all the time regular, she is athlete, she was very lean, then BCPs. She has been off BCPs for the past 4 years.     Galactorrhea: no, no breast tenderness. 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     Family history   HTN: both father and mother, DM: father, Mother: lupus.       Past Medical/Surgical History:  No past medical history on file.  No past surgical history on file.    Allergies:  No Known Allergies    Current Medications   Current Outpatient Medications   Medication Sig Dispense Refill    escitalopram (LEXAPRO) 10 MG tablet Take 1 tablet (10 mg) by mouth daily 90 tablet 3    FLUZONE QUADRIVALENT 0.5 ML injection       multivitamin w/minerals (MULTI-VITAMIN) tablet Take 1 tablet by mouth daily      Prenatal Vit-Fe Fumarate-FA (PRENATAL MULTIVITAMIN W/IRON) 27-0.8 MG tablet Take 1 tablet by mouth daily  "(Patient not taking: Reported on 2/12/2024) 90 tablet 3     No current facility-administered medications for this visit.       Family History:  Family History   Problem Relation Age of Onset    Hypertension Mother     Depression Mother     Anxiety Disorder Mother     Diabetes Father     Hypertension Father        Social History:  Social History     Tobacco Use    Smoking status: Never    Smokeless tobacco: Never   Substance Use Topics    Alcohol use: Not Currently       ROS:  Full review of systems taken with the help of the intake sheet. Otherwise a complete 14 point review of systems was taken and is negative unless stated in the history above.    Physical Exam:   Vitals: Wt 81.6 kg (180 lb)   BMI 31.39 kg/m    BMI= Body mass index is 31.39 kg/m .   General: well appearing, no acute distress, pleasant and conversant,   Mental Status/neuro: alert and oriented  Face: symmetrical, normal facial color  Eyes: anicteric, no proptosis or lid lag  Resp: normally breathing      Labs : I reviewed data from epic and extract and summarize the pertinent data here.   No results found for: \"NA\"   No results found for: \"POTASSIUM\"  No results found for: \"CHLORIDE\"  No results found for: \"MARSHAL\"  No results found for: \"CO2\"  No results found for: \"BUN\"  No results found for: \"CR\"  No results found for: \"GLC\"  Lab Results   Component Value Date    TSH 1.46 04/11/2024     No results found for: \"T4\"  No results found for: \"A1C\"    No results found for: \"IGF1\"  No results found for: \"LH\"  Lab Results   Component Value Date    FSH 6.9 04/11/2024     Lab Results   Component Value Date    ESTROGEN 70 04/11/2024     No results found for: \"PROLACTIN\"        MRI Brain: I personally reviewed the original images and agree with the below reports.   No results found for this or any previous visit.          Assessment and Plan  29 year old female with mildly elevated PRL, infertility    - check pituitary panel in 1 week  IGF1, PRL, TSH, Free T4, "     - brain MRI    - start cabergoline 0.25 mg weekly     - repeat lab in 2, 6 months          Return to clinic with me in 6 months    45 minutes spent on the date of the encounter doing chart review, history and exam, personal review of imaging, outside record, documentation and further activities as noted above.    Note: Chart documentation done in part with Dragon Voice Recognition software. Although reviewed after completion, some word and grammatical errors may remain.  Please consider this when interpreting information in this chart     Marianne Navas MD  Staff Physician  Endocrinology and Metabolism  License: [id number]

## 2024-06-20 ENCOUNTER — LAB (OUTPATIENT)
Dept: LAB | Facility: CLINIC | Age: 29
End: 2024-06-20
Payer: COMMERCIAL

## 2024-06-20 DIAGNOSIS — R79.89 ELEVATED PROLACTIN LEVEL: ICD-10-CM

## 2024-06-20 LAB
PROLACTIN SERPL 3RD IS-MCNC: 25 NG/ML (ref 5–23)
T4 FREE SERPL-MCNC: 1.14 NG/DL (ref 0.9–1.7)
TSH SERPL DL<=0.005 MIU/L-ACNC: 0.87 UIU/ML (ref 0.3–4.2)

## 2024-06-20 PROCEDURE — 36415 COLL VENOUS BLD VENIPUNCTURE: CPT

## 2024-06-20 PROCEDURE — 84443 ASSAY THYROID STIM HORMONE: CPT

## 2024-06-20 PROCEDURE — 84305 ASSAY OF SOMATOMEDIN: CPT

## 2024-06-20 PROCEDURE — 84146 ASSAY OF PROLACTIN: CPT

## 2024-06-20 PROCEDURE — 84439 ASSAY OF FREE THYROXINE: CPT

## 2024-06-21 LAB — IGF-I BLD-MCNC: 218 NG/ML (ref 91–293)

## 2024-06-27 ENCOUNTER — HOSPITAL ENCOUNTER (OUTPATIENT)
Dept: MRI IMAGING | Facility: HOSPITAL | Age: 29
Discharge: HOME | End: 2024-06-27
Attending: INTERNAL MEDICINE | Admitting: INTERNAL MEDICINE
Payer: COMMERCIAL

## 2024-06-27 DIAGNOSIS — R79.89 ELEVATED PROLACTIN LEVEL: ICD-10-CM

## 2024-06-27 PROCEDURE — A9585 GADOBUTROL INJECTION: HCPCS | Performed by: INTERNAL MEDICINE

## 2024-06-27 PROCEDURE — 255N000002 HC RX 255 OP 636: Performed by: INTERNAL MEDICINE

## 2024-06-27 PROCEDURE — 70553 MRI BRAIN STEM W/O & W/DYE: CPT

## 2024-06-27 RX ORDER — GADOBUTROL 604.72 MG/ML
0.1 INJECTION INTRAVENOUS ONCE
Status: COMPLETED | OUTPATIENT
Start: 2024-06-27 | End: 2024-06-27

## 2024-06-27 RX ADMIN — GADOBUTROL 8 ML: 604.72 INJECTION INTRAVENOUS at 15:52

## 2024-07-17 ENCOUNTER — TELEPHONE (OUTPATIENT)
Dept: ENDOCRINOLOGY | Facility: CLINIC | Age: 29
End: 2024-07-17
Payer: COMMERCIAL

## 2024-07-17 NOTE — TELEPHONE ENCOUNTER
Patient confirmed scheduled appointment:  Date: 12/20/24  Time: 4 pm  Visit type: return pituitary  Provider: Dr. Navas  Location: virtual at   Testing/imaging: NA  Additional notes: patient declined scheduling lab appointments at this time and said they would schedule those later- sent myc x1 with scheduling info.

## 2024-07-28 ENCOUNTER — HEALTH MAINTENANCE LETTER (OUTPATIENT)
Age: 29
End: 2024-07-28

## 2024-07-29 SDOH — HEALTH STABILITY: PHYSICAL HEALTH: ON AVERAGE, HOW MANY DAYS PER WEEK DO YOU ENGAGE IN MODERATE TO STRENUOUS EXERCISE (LIKE A BRISK WALK)?: 6 DAYS

## 2024-07-29 SDOH — HEALTH STABILITY: PHYSICAL HEALTH: ON AVERAGE, HOW MANY MINUTES DO YOU ENGAGE IN EXERCISE AT THIS LEVEL?: 30 MIN

## 2024-07-29 ASSESSMENT — SOCIAL DETERMINANTS OF HEALTH (SDOH): HOW OFTEN DO YOU GET TOGETHER WITH FRIENDS OR RELATIVES?: ONCE A WEEK

## 2024-07-30 ENCOUNTER — OFFICE VISIT (OUTPATIENT)
Dept: FAMILY MEDICINE | Facility: CLINIC | Age: 29
End: 2024-07-30
Payer: COMMERCIAL

## 2024-07-30 VITALS
HEIGHT: 64 IN | SYSTOLIC BLOOD PRESSURE: 108 MMHG | OXYGEN SATURATION: 98 % | DIASTOLIC BLOOD PRESSURE: 70 MMHG | WEIGHT: 185 LBS | BODY MASS INDEX: 31.58 KG/M2 | TEMPERATURE: 98.4 F | RESPIRATION RATE: 16 BRPM | HEART RATE: 60 BPM

## 2024-07-30 DIAGNOSIS — F41.9 ANXIETY: ICD-10-CM

## 2024-07-30 DIAGNOSIS — Z00.00 ROUTINE GENERAL MEDICAL EXAMINATION AT A HEALTH CARE FACILITY: Primary | ICD-10-CM

## 2024-07-30 PROCEDURE — 99395 PREV VISIT EST AGE 18-39: CPT | Performed by: PHYSICIAN ASSISTANT

## 2024-07-30 RX ORDER — ESCITALOPRAM OXALATE 10 MG/1
10 TABLET ORAL DAILY
Qty: 90 TABLET | Refills: 3 | Status: CANCELLED | OUTPATIENT
Start: 2024-07-30

## 2024-07-30 ASSESSMENT — PAIN SCALES - GENERAL: PAINLEVEL: NO PAIN (0)

## 2024-07-30 NOTE — PROGRESS NOTES
"Preventive Care Visit  [facility]  Kip Huerta PA-C, Family Medicine  Jul 30, 2024      Assessment & Plan     Routine general medical examination at a health care facility  Reviewed personal and family history. Reviewed age appropriate screenings. Reviewed healthy BP and BMI ranges. Counseled on lifestyle modifications for optimal mental and physical health.  Discussed age-appropriate health maintanence. Recommended any needed vaccinations. Continue to focus on well balanced diet and exercise       Anxiety  If she is going to travel back to [place] we can do another appointment when refills of Lexapro are needed.  I did explain however that state to state appointment between [place] and [place] cannot be done.  Appointment was made for Dec.    Patient has been advised of split billing requirements and indicates understanding: Yes        BMI  Estimated body mass index is 31.76 kg/m  as calculated from the following:    Height as of this encounter: 1.626 m (5' 4\").    Weight as of this encounter: 83.9 kg (185 lb).   Weight management plan: Discussed healthy diet and exercise guidelines    Counseling  Appropriate preventive services were addressed with this patient via screening, questionnaire, or discussion as appropriate for fall prevention, nutrition, physical activity, Tobacco-use cessation, weight loss and cognition.  Checklist reviewing preventive services available has been given to the patient.  Reviewed patient's diet, addressing concerns and/or questions.           Azeem Looney is a 29 year old, presenting for the following:  Physical        7/30/2024     9:52 AM   Additional Questions   Roomed by Micaela         7/30/2024     9:52 AM   Patient Reported Additional Medications   Patient reports taking the following new medications none        Health Care Directive  Patient does not have a Health Care Directive or Living Will: Discussed advance care planning with patient; however, " patient declined at this time.    HPI    Patient here today for physical.  She has been living here for a couple years going to grad school  Seeing [facility] for infertility, just started new meds for prolactin elevation      Mood- has been on lexapro for a couple years for anxiety.  Working well.  Does not need a refill today.           7/29/2024   General Health   How would you rate your overall physical health? Good   Feel stress (tense, anxious, or unable to sleep) To some extent      (!) STRESS CONCERN      7/29/2024   Nutrition   Three or more servings of calcium each day? Yes   Diet: Regular (no restrictions)   How many servings of fruit and vegetables per day? 4 or more   How many sweetened beverages each day? (!) 2            7/29/2024   Exercise   Days per week of moderate/strenous exercise 6 days   Average minutes spent exercising at this level 30 min            7/29/2024   Social Factors   Frequency of gathering with friends or relatives Once a week   Worry food won't last until get money to buy more No   Food not last or not have enough money for food? No   Do you have housing? (Housing is defined as stable permanent housing and does not include staying ouside in a car, in a tent, in an abandoned building, in an overnight shelter, or couch-surfing.) Yes   Are you worried about losing your housing? No   Lack of transportation? No   Unable to get utilities (heat,electricity)? No            7/29/2024   Dental   Dentist two times every year? Yes            7/29/2024   TB Screening   Were you born outside of the [place]? No              Today's PHQ-2 Score:       6/19/2024     9:45 AM   PHQ-2 ( 1999 [employer])   Q1: Little interest or pleasure in doing things 0   Q2: Feeling down, depressed or hopeless 1   PHQ-2 Score 1         7/29/2024   Substance Use   Alcohol more than 3/day or more than 7/wk No   Do you use any other substances recreationally? No        Social History     Tobacco Use    Smoking status: Never      "Passive exposure: Never    Smokeless tobacco: Never   Vaping Use    Vaping status: Never Used   Substance Use Topics    Alcohol use: Yes     Comment: Barby once in awhile    Drug use: Never          Mammogram Screening - Patient under 40 years of age: Routine Mammogram Screening not recommended.           7/29/2024   One time HIV Screening   Previous HIV test? No          7/29/2024   STI Screening   New sexual partner(s) since last STI/HIV test? No        History of abnormal Pap smear: No - age 21-29 PAP every 3 years recommended        5/1/2023     9:42 AM   PAP / HPV   PAP Negative for Intraepithelial Lesion or Malignancy (NILM)            7/29/2024   Contraception/Family Planning   Questions about contraception or family planning No           Reviewed and updated as needed this visit by Provider                        Review of Systems  Constitutional, HEENT, cardiovascular, pulmonary, gi and gu systems are negative, except as otherwise noted.     Objective    Exam  /70 (BP Location: Right arm, Patient Position: Sitting, Cuff Size: Adult Regular)   Pulse 60   Temp 98.4  F (36.9  C) (Oral)   Resp 16   Ht 1.626 m (5' 4\")   Wt 83.9 kg (185 lb)   LMP 07/08/2024 (Exact Date)   SpO2 98%   BMI 31.76 kg/m     Estimated body mass index is 31.76 kg/m  as calculated from the following:    Height as of this encounter: 1.626 m (5' 4\").    Weight as of this encounter: 83.9 kg (185 lb).    Physical Exam  GENERAL: alert and no distress  EYES: Eyes grossly normal to inspection, PERRL and conjunctivae and sclerae normal  HENT: ear canals and TM's normal, nose and mouth without ulcers or lesions  NECK: no adenopathy, no asymmetry, masses, or scars  RESP: lungs clear to auscultation - no rales, rhonchi or wheezes  CV: regular rate and rhythm, normal S1 S2, no S3 or S4, no murmur, click or rub, no peripheral edema  ABDOMEN: soft, nontender, no hepatosplenomegaly, no masses and bowel sounds normal  MS: no gross " musculoskeletal defects noted, no edema  SKIN: no suspicious lesions or rashes  NEURO: Normal strength and tone, mentation intact and speech normal  PSYCH: mentation appears normal, affect normal/bright        Signed Electronically by: Kip Huerta PA-C

## 2024-07-30 NOTE — PATIENT INSTRUCTIONS
Patient Education   Preventive Care Advice   This is general advice given by our system to help you stay healthy. However, your care team may have specific advice just for you. Please talk to your care team about your preventive care needs.  Nutrition  Eat 5 or more servings of fruits and vegetables each day.  Try wheat bread, brown rice and whole grain pasta (instead of white bread, rice, and pasta).  Get enough calcium and vitamin D. Check the label on foods and aim for 100% of the RDA (recommended daily allowance).  Lifestyle  Exercise at least 150 minutes each week  (30 minutes a day, 5 days a week).  Do muscle strengthening activities 2 days a week. These help control your weight and prevent disease.  No smoking.  Wear sunscreen to prevent skin cancer.  Have a dental exam and cleaning every 6 months.  Yearly exams  See your health care team every year to talk about:  Any changes in your health.  Any medicines your care team has prescribed.  Preventive care, family planning, and ways to prevent chronic diseases.  Shots (vaccines)   HPV shots (up to age 26), if you've never had them before.  Hepatitis B shots (up to age 59), if you've never had them before.  COVID-19 shot: Get this shot when it's due.  Flu shot: Get a flu shot every year.  Tetanus shot: Get a tetanus shot every 10 years.  Pneumococcal, hepatitis A, and RSV shots: Ask your care team if you need these based on your risk.  Shingles shot (for age 50 and up)  General health tests  Diabetes screening:  Starting at age 35, Get screened for diabetes at least every 3 years.  If you are younger than age 35, ask your care team if you should be screened for diabetes.  Cholesterol test: At age 39, start having a cholesterol test every 5 years, or more often if advised.  Bone density scan (DEXA): At age 50, ask your care team if you should have this scan for osteoporosis (brittle bones).  Hepatitis C: Get tested at least once in your life.  STIs (sexually  transmitted infections)  Before age 24: Ask your care team if you should be screened for STIs.  After age 24: Get screened for STIs if you're at risk. You are at risk for STIs (including HIV) if:  You are sexually active with more than one person.  You don't use condoms every time.  You or a partner was diagnosed with a sexually transmitted infection.  If you are at risk for HIV, ask about PrEP medicine to prevent HIV.  Get tested for HIV at least once in your life, whether you are at risk for HIV or not.  Cancer screening tests  Cervical cancer screening: If you have a cervix, begin getting regular cervical cancer screening tests starting at age 21.  Breast cancer scan (mammogram): If you've ever had breasts, begin having regular mammograms starting at age 40. This is a scan to check for breast cancer.  Colon cancer screening: It is important to start screening for colon cancer at age 45.  Have a colonoscopy test every 10 years (or more often if you're at risk) Or, ask your provider about stool tests like a FIT test every year or Cologuard test every 3 years.  To learn more about your testing options, visit:   .  For help making a decision, visit:   https://bit.ly/ob42839.  Prostate cancer screening test: If you have a prostate, ask your care team if a prostate cancer screening test (PSA) at age 55 is right for you.  Lung cancer screening: If you are a current or former smoker ages 50 to 80, ask your care team if ongoing lung cancer screenings are right for you.  For informational purposes only. Not to replace the advice of your health care provider. Copyright   2023 [employer]. All rights reserved. Clinically reviewed by the [facility]. [employer] 708053 - REV 01/24.

## 2025-01-31 DIAGNOSIS — F41.9 ANXIETY: ICD-10-CM

## 2025-02-02 RX ORDER — ESCITALOPRAM OXALATE 10 MG/1
10 TABLET ORAL DAILY
Qty: 90 TABLET | Refills: 1 | Status: SHIPPED | OUTPATIENT
Start: 2025-02-02

## 2025-06-24 NOTE — PROGRESS NOTES
Video visit  Start 10:15 am  End 10:50 am  [facility]                                                                               - Endocrinology Initial Consultation -    Reason for visit/consult:  Elevated prolactin level     Primary care provider: System, Provider Not In    HPI: A 29 year old female is here for endocrinology clinic visit for elevated prolactin level.   She has been trying to become pregnant for 3 years. She went to OB-GYN evaluation, where PRL level was checked.  4/11/2024 25, 4/17/2024 34 (PRL 5-23).     Menstrual cycle: 2023 no period for 3 months, past 5 months 32-34 days consistently. Flow medium-light.   Menarche: age 12. Not all the time regular, she is athlete, she was very lean, then BCPs. She has been off BCPs for the past 4 years.     Galactorrhea: no, no breast tenderness. 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     Family history   HTN: both father and mother, DM: father, Mother: lupus.       Past Medical/Surgical History:  No past medical history on file.  No past surgical history on file.    Allergies:  No Known Allergies    Current Medications   Current Outpatient Medications   Medication Sig Dispense Refill    escitalopram (LEXAPRO) 10 MG tablet Take 1 tablet (10 mg) by mouth daily 90 tablet 3    FLUZONE QUADRIVALENT 0.5 ML injection       multivitamin w/minerals (MULTI-VITAMIN) tablet Take 1 tablet by mouth daily      Prenatal Vit-Fe Fumarate-FA (PRENATAL MULTIVITAMIN W/IRON) 27-0.8 MG tablet Take 1 tablet by mouth daily (Patient not taking: Reported on 2/12/2024) 90 tablet 3     No current facility-administered medications for this visit.       Family History:  Family History   Problem Relation Age of Onset    Hypertension Mother     Depression Mother     Anxiety Disorder Mother     Diabetes Father     Hypertension Father        Social  "History:  Social History     Tobacco Use    Smoking status: Never    Smokeless tobacco: Never   Substance Use Topics    Alcohol use: Not Currently       ROS:  Full review of systems taken with the help of the intake sheet. Otherwise a complete 14 point review of systems was taken and is negative unless stated in the history above.    Physical Exam:   Vitals: Wt 81.6 kg (180 lb)   BMI 31.39 kg/m    BMI= Body mass index is 31.39 kg/m .   General: well appearing, no acute distress, pleasant and conversant,   Mental Status/neuro: alert and oriented  Face: symmetrical, normal facial color  Eyes: anicteric, no proptosis or lid lag  Resp: normally breathing      Labs : I reviewed data from epic and extract and summarize the pertinent data here.   No results found for: \"NA\"   No results found for: \"POTASSIUM\"  No results found for: \"CHLORIDE\"  No results found for: \"MARSHAL\"  No results found for: \"CO2\"  No results found for: \"BUN\"  No results found for: \"CR\"  No results found for: \"GLC\"  Lab Results   Component Value Date    TSH 1.46 04/11/2024     No results found for: \"T4\"  No results found for: \"A1C\"    No results found for: \"IGF1\"  No results found for: \"LH\"  Lab Results   Component Value Date    FSH 6.9 04/11/2024     Lab Results   Component Value Date    ESTROGEN 70 04/11/2024     No results found for: \"PROLACTIN\"        MRI Brain: I personally reviewed the original images and agree with the below reports.   No results found for this or any previous visit.          Assessment and Plan  29 year old female with mildly elevated PRL, infertility    - check pituitary panel in 1 week  IGF1, PRL, TSH, Free T4,     - brain MRI    - start cabergoline 0.25 mg weekly     - repeat lab in 2, 6 months          Return to clinic with me in 6 months    45 minutes spent on the date of the encounter doing chart review, history and exam, personal review of imaging, outside record, documentation and further activities as noted " above.    Note: Chart documentation done in part with Dragon Voice Recognition software. Although reviewed after completion, some word and grammatical errors may remain.  Please consider this when interpreting information in this chart     Marianne Navas MD  Staff Physician  Endocrinology and Metabolism  License: [id number]    20.2

## 2025-08-15 DIAGNOSIS — R79.89 ELEVATED PROLACTIN LEVEL: ICD-10-CM

## 2025-08-18 RX ORDER — CABERGOLINE 0.5 MG/1
0.25 TABLET ORAL WEEKLY
Qty: 6 TABLET | Refills: 3 | Status: SHIPPED | OUTPATIENT
Start: 2025-08-18

## 2025-08-31 ENCOUNTER — HEALTH MAINTENANCE LETTER (OUTPATIENT)
Age: 30
End: 2025-08-31